# Patient Record
Sex: FEMALE | Race: WHITE | Employment: FULL TIME | URBAN - METROPOLITAN AREA
[De-identification: names, ages, dates, MRNs, and addresses within clinical notes are randomized per-mention and may not be internally consistent; named-entity substitution may affect disease eponyms.]

---

## 2018-02-16 PROBLEM — R22.32 FINGER MASS, LEFT: Status: ACTIVE | Noted: 2018-02-16

## 2021-02-17 PROBLEM — N20.1 RIGHT URETERAL STONE: Status: ACTIVE | Noted: 2021-02-17

## 2021-02-17 PROBLEM — N20.1 RIGHT URETERAL STONE: Status: RESOLVED | Noted: 2021-02-17 | Resolved: 2021-02-17

## 2021-02-17 PROBLEM — N39.0 ACUTE UTI: Status: ACTIVE | Noted: 2021-02-17

## 2022-07-10 ENCOUNTER — HOSPITAL ENCOUNTER (EMERGENCY)
Age: 62
Discharge: HOME | End: 2022-07-10
Attending: EMERGENCY MEDICINE
Payer: COMMERCIAL

## 2022-07-10 VITALS
HEIGHT: 64 IN | OXYGEN SATURATION: 100 % | WEIGHT: 118 LBS | TEMPERATURE: 98 F | SYSTOLIC BLOOD PRESSURE: 157 MMHG | DIASTOLIC BLOOD PRESSURE: 92 MMHG | BODY MASS INDEX: 20.14 KG/M2 | HEART RATE: 88 BPM | RESPIRATION RATE: 18 BRPM

## 2022-07-10 DIAGNOSIS — J45.909 UNCOMPLICATED ASTHMA, UNSPECIFIED ASTHMA SEVERITY, UNSPECIFIED WHETHER PERSISTENT: Primary | ICD-10-CM

## 2022-07-10 PROCEDURE — 99282 EMERGENCY DEPT VISIT SF MDM: CPT

## 2022-07-10 RX ORDER — FEXOFENADINE HYDROCHLORIDE 60 MG/1
60 TABLET, FILM COATED ORAL DAILY
COMMUNITY

## 2022-07-11 NOTE — ED INITIAL ASSESSMENT (HPI)
Pt states she has been without her inhaler for 3 weeks. States her asthma is getting worse. States she is leaving for vacation tomorrow and need meds before leaving.

## 2022-10-18 ENCOUNTER — TELEPHONE (OUTPATIENT)
Dept: FAMILY MEDICINE CLINIC | Facility: CLINIC | Age: 62
End: 2022-10-18

## 2022-10-18 NOTE — TELEPHONE ENCOUNTER
DOS 11/17/22 with Dr. Milka Arce for a foot surgery. Pt is having orders faxed over from surgeons office.  Checklist in folder at

## 2022-10-20 NOTE — TELEPHONE ENCOUNTER
Received fax from Dr Fabio Mora with pre op orders    Paper work placed in [employer] No assistance needed

## 2022-10-28 ENCOUNTER — TELEPHONE (OUTPATIENT)
Dept: FAMILY MEDICINE CLINIC | Facility: CLINIC | Age: 62
End: 2022-10-28

## 2022-10-28 ENCOUNTER — OFFICE VISIT (OUTPATIENT)
Dept: FAMILY MEDICINE CLINIC | Facility: CLINIC | Age: 62
End: 2022-10-28
Payer: COMMERCIAL

## 2022-10-28 VITALS
HEIGHT: 64 IN | BODY MASS INDEX: 19.12 KG/M2 | DIASTOLIC BLOOD PRESSURE: 72 MMHG | TEMPERATURE: 97 F | RESPIRATION RATE: 18 BRPM | HEART RATE: 78 BPM | WEIGHT: 112 LBS | SYSTOLIC BLOOD PRESSURE: 120 MMHG | OXYGEN SATURATION: 98 %

## 2022-10-28 DIAGNOSIS — Z23 NEED FOR INFLUENZA VACCINATION: ICD-10-CM

## 2022-10-28 DIAGNOSIS — Z01.818 PREOP EXAMINATION: Primary | ICD-10-CM

## 2022-10-28 DIAGNOSIS — R94.31 ABNORMAL EKG: Primary | ICD-10-CM

## 2022-10-28 DIAGNOSIS — R94.31 ABNORMAL EKG: ICD-10-CM

## 2022-10-28 DIAGNOSIS — Z01.818 PREOP EXAMINATION: ICD-10-CM

## 2022-10-28 PROBLEM — N39.0 ACUTE UTI: Status: RESOLVED | Noted: 2021-02-17 | Resolved: 2022-10-28

## 2022-10-28 PROBLEM — R22.32 FINGER MASS, LEFT: Status: RESOLVED | Noted: 2018-02-16 | Resolved: 2022-10-28

## 2022-10-28 LAB
ALBUMIN SERPL-MCNC: 3.8 G/DL (ref 3.4–5)
ALBUMIN/GLOB SERPL: 1.2 {RATIO} (ref 1–2)
ALP LIVER SERPL-CCNC: 47 U/L
ALT SERPL-CCNC: 32 U/L
ANION GAP SERPL CALC-SCNC: 5 MMOL/L (ref 0–18)
AST SERPL-CCNC: 26 U/L (ref 15–37)
BASOPHILS # BLD AUTO: 0.02 X10(3) UL (ref 0–0.2)
BASOPHILS NFR BLD AUTO: 0.4 %
BILIRUB SERPL-MCNC: 0.4 MG/DL (ref 0.1–2)
BUN BLD-MCNC: 21 MG/DL (ref 7–18)
CALCIUM BLD-MCNC: 9.1 MG/DL (ref 8.5–10.1)
CHLORIDE SERPL-SCNC: 102 MMOL/L (ref 98–112)
CHOLEST SERPL-MCNC: 244 MG/DL (ref ?–200)
CO2 SERPL-SCNC: 25 MMOL/L (ref 21–32)
CREAT BLD-MCNC: 0.79 MG/DL
EOSINOPHIL # BLD AUTO: 0.05 X10(3) UL (ref 0–0.7)
EOSINOPHIL NFR BLD AUTO: 1 %
ERYTHROCYTE [DISTWIDTH] IN BLOOD BY AUTOMATED COUNT: 13.1 %
FASTING PATIENT LIPID ANSWER: NO
FASTING STATUS PATIENT QL REPORTED: NO
GFR SERPLBLD BASED ON 1.73 SQ M-ARVRAT: 85 ML/MIN/1.73M2 (ref 60–?)
GLOBULIN PLAS-MCNC: 3.3 G/DL (ref 2.8–4.4)
GLUCOSE BLD-MCNC: 97 MG/DL (ref 70–99)
HCT VFR BLD AUTO: 37.9 %
HDLC SERPL-MCNC: 89 MG/DL (ref 40–59)
HGB BLD-MCNC: 12.4 G/DL
IMM GRANULOCYTES # BLD AUTO: 0.01 X10(3) UL (ref 0–1)
IMM GRANULOCYTES NFR BLD: 0.2 %
LDLC SERPL CALC-MCNC: 120 MG/DL (ref ?–100)
LYMPHOCYTES # BLD AUTO: 1.14 X10(3) UL (ref 1–4)
LYMPHOCYTES NFR BLD AUTO: 21.8 %
MCH RBC QN AUTO: 33.4 PG (ref 26–34)
MCHC RBC AUTO-ENTMCNC: 32.7 G/DL (ref 31–37)
MCV RBC AUTO: 102.2 FL
MONOCYTES # BLD AUTO: 0.5 X10(3) UL (ref 0.1–1)
MONOCYTES NFR BLD AUTO: 9.5 %
NEUTROPHILS # BLD AUTO: 3.52 X10 (3) UL (ref 1.5–7.7)
NEUTROPHILS # BLD AUTO: 3.52 X10(3) UL (ref 1.5–7.7)
NEUTROPHILS NFR BLD AUTO: 67.1 %
NONHDLC SERPL-MCNC: 155 MG/DL (ref ?–130)
OSMOLALITY SERPL CALC.SUM OF ELEC: 277 MOSM/KG (ref 275–295)
PLATELET # BLD AUTO: 333 10(3)UL (ref 150–450)
POTASSIUM SERPL-SCNC: 4.2 MMOL/L (ref 3.5–5.1)
PROT SERPL-MCNC: 7.1 G/DL (ref 6.4–8.2)
RBC # BLD AUTO: 3.71 X10(6)UL
SODIUM SERPL-SCNC: 132 MMOL/L (ref 136–145)
TRIGL SERPL-MCNC: 208 MG/DL (ref 30–149)
VLDLC SERPL CALC-MCNC: 37 MG/DL (ref 0–30)
WBC # BLD AUTO: 5.2 X10(3) UL (ref 4–11)

## 2022-10-28 PROCEDURE — 80061 LIPID PANEL: CPT | Performed by: FAMILY MEDICINE

## 2022-10-28 PROCEDURE — 99204 OFFICE O/P NEW MOD 45 MIN: CPT | Performed by: FAMILY MEDICINE

## 2022-10-28 PROCEDURE — 80053 COMPREHEN METABOLIC PANEL: CPT | Performed by: FAMILY MEDICINE

## 2022-10-28 PROCEDURE — 3078F DIAST BP <80 MM HG: CPT | Performed by: FAMILY MEDICINE

## 2022-10-28 PROCEDURE — 3074F SYST BP LT 130 MM HG: CPT | Performed by: FAMILY MEDICINE

## 2022-10-28 PROCEDURE — 85025 COMPLETE CBC W/AUTO DIFF WBC: CPT | Performed by: FAMILY MEDICINE

## 2022-10-28 PROCEDURE — 93000 ELECTROCARDIOGRAM COMPLETE: CPT | Performed by: FAMILY MEDICINE

## 2022-10-28 PROCEDURE — 3008F BODY MASS INDEX DOCD: CPT | Performed by: FAMILY MEDICINE

## 2022-10-28 RX ORDER — FLUTICASONE PROPIONATE 232 UG/1
1 POWDER, METERED RESPIRATORY (INHALATION) 2 TIMES DAILY
COMMUNITY
Start: 2022-09-06

## 2022-10-28 RX ORDER — FLUTICASONE PROPIONATE 232 UG/1
1 POWDER, METERED RESPIRATORY (INHALATION) DAILY
COMMUNITY
Start: 2022-09-06 | End: 2022-10-28 | Stop reason: ALTCHOICE

## 2022-10-28 NOTE — TELEPHONE ENCOUNTER
EDGAR phoned back. Patient has appointment Monday 10/31/22 with Dr. Douglas Guaman. EKG faxed to [facility].

## 2022-10-28 NOTE — PATIENT INSTRUCTIONS
You will be contacted by Michelle Verma, my nurse, once she is able to set up an appointment with the cardiologist for your surgical clearance. I will contact you with your test results once available. Once I have the clearance from the cardiologist, I will forward all the information to your surgeon. Avoid aspirin, Ibuprofen, Motrin, Advil, Aleve or Naproxen for 2 weeks prior to your surgery. Tylenol is safe to take if you have pain or fever. Contact me if you are having any problems with your asthma before your surgery. Otherwise, continue your medications as prescribed.

## 2022-10-28 NOTE — TELEPHONE ENCOUNTER
Patient needing cardiac clearance for upcoming surgery. Phoned [facility]. They will check schedule. Will reach out to patient and our office as well.

## 2022-10-31 ENCOUNTER — PATIENT MESSAGE (OUTPATIENT)
Dept: FAMILY MEDICINE CLINIC | Facility: CLINIC | Age: 62
End: 2022-10-31

## 2022-10-31 DIAGNOSIS — E87.1 HYPONATREMIA: ICD-10-CM

## 2022-10-31 DIAGNOSIS — R89.9 ABNORMAL LABORATORY TEST: Primary | ICD-10-CM

## 2022-11-03 ENCOUNTER — TELEPHONE (OUTPATIENT)
Dept: FAMILY MEDICINE CLINIC | Facility: CLINIC | Age: 62
End: 2022-11-03

## 2022-11-03 NOTE — TELEPHONE ENCOUNTER
Medical records received from Dr. Dewey Fulton, cardiologist, [address] [place] [place] [address] Drive 10/31/2022. Repeat EKG in his office was normal.    No further cardiac testing is required. Patient cleared for surgery.

## 2022-11-04 ENCOUNTER — LAB ENCOUNTER (OUTPATIENT)
Dept: LAB | Age: 62
End: 2022-11-04
Attending: FAMILY MEDICINE
Payer: COMMERCIAL

## 2022-11-04 DIAGNOSIS — R89.9 ABNORMAL LABORATORY TEST: ICD-10-CM

## 2022-11-04 DIAGNOSIS — E87.1 HYPONATREMIA: ICD-10-CM

## 2022-11-04 LAB
ANION GAP SERPL CALC-SCNC: 5 MMOL/L (ref 0–18)
BUN BLD-MCNC: 16 MG/DL (ref 7–18)
CALCIUM BLD-MCNC: 9.1 MG/DL (ref 8.5–10.1)
CHLORIDE SERPL-SCNC: 103 MMOL/L (ref 98–112)
CO2 SERPL-SCNC: 27 MMOL/L (ref 21–32)
CREAT BLD-MCNC: 0.64 MG/DL
FASTING STATUS PATIENT QL REPORTED: NO
FOLATE SERPL-MCNC: 7.7 NG/ML (ref 8.7–?)
GFR SERPLBLD BASED ON 1.73 SQ M-ARVRAT: 100 ML/MIN/1.73M2 (ref 60–?)
GLUCOSE BLD-MCNC: 79 MG/DL (ref 70–99)
OSMOLALITY SERPL CALC.SUM OF ELEC: 280 MOSM/KG (ref 275–295)
POTASSIUM SERPL-SCNC: 4.6 MMOL/L (ref 3.5–5.1)
SODIUM SERPL-SCNC: 135 MMOL/L (ref 136–145)
VIT B12 SERPL-MCNC: 848 PG/ML (ref 193–986)

## 2022-11-04 PROCEDURE — 82746 ASSAY OF FOLIC ACID SERUM: CPT

## 2022-11-04 PROCEDURE — 80048 BASIC METABOLIC PNL TOTAL CA: CPT

## 2022-11-04 PROCEDURE — 82607 VITAMIN B-12: CPT

## 2022-11-04 PROCEDURE — 36415 COLL VENOUS BLD VENIPUNCTURE: CPT

## 2022-11-07 ENCOUNTER — TELEPHONE (OUTPATIENT)
Dept: FAMILY MEDICINE CLINIC | Facility: CLINIC | Age: 62
End: 2022-11-07

## 2022-11-07 NOTE — TELEPHONE ENCOUNTER
Preop H&P, along with labs, EKG, cardiac clearance all faxed to [facility] and to [address] [place] [address] Specialist [phone]

## 2023-11-17 ENCOUNTER — TELEPHONE (OUTPATIENT)
Dept: ORTHOPEDICS CLINIC | Facility: CLINIC | Age: 63
End: 2023-11-17

## 2023-11-17 DIAGNOSIS — M25.521 RIGHT ELBOW PAIN: Primary | ICD-10-CM

## 2023-11-17 NOTE — TELEPHONE ENCOUNTER
Patient has an upcoming appt for RT Elbow . At this time patient has not had any imaging done, please place RX accordingly. I have notified the patient to come in 20-30 min prior to appointment time to have the imaging done . Please forward to the  pool to schedule imaging. Thank you.       Future Appointments   Date Time Provider Gabriella Coley   11/27/2023  3:00 PM DO JOHN Sanchez [id number]

## 2023-11-27 ENCOUNTER — OFFICE VISIT (OUTPATIENT)
Dept: ORTHOPEDICS CLINIC | Facility: CLINIC | Age: 63
End: 2023-11-27
Payer: COMMERCIAL

## 2023-11-27 ENCOUNTER — HOSPITAL ENCOUNTER (OUTPATIENT)
Dept: GENERAL RADIOLOGY | Age: 63
Discharge: HOME | End: 2023-11-27
Attending: FAMILY MEDICINE
Payer: COMMERCIAL

## 2023-11-27 VITALS — HEIGHT: 64 IN | BODY MASS INDEX: 19.12 KG/M2 | WEIGHT: 112 LBS

## 2023-11-27 DIAGNOSIS — M77.11 LATERAL EPICONDYLITIS OF RIGHT ELBOW: ICD-10-CM

## 2023-11-27 DIAGNOSIS — M25.521 RIGHT ELBOW PAIN: ICD-10-CM

## 2023-11-27 DIAGNOSIS — M77.01 GOLFER'S ELBOW, RIGHT: ICD-10-CM

## 2023-11-27 DIAGNOSIS — M24.129 INTERNAL DERANGEMENT OF ELBOW: Primary | ICD-10-CM

## 2023-11-27 PROCEDURE — 99204 OFFICE O/P NEW MOD 45 MIN: CPT | Performed by: FAMILY MEDICINE

## 2023-11-27 PROCEDURE — 3008F BODY MASS INDEX DOCD: CPT | Performed by: FAMILY MEDICINE

## 2023-11-27 PROCEDURE — 73080 X-RAY EXAM OF ELBOW: CPT | Performed by: FAMILY MEDICINE

## 2023-11-27 RX ORDER — PLECANATIDE 3 MG/1
TABLET ORAL
COMMUNITY
Start: 2023-07-27

## 2023-11-27 RX ORDER — FLUTICASONE PROPIONATE 110 UG/1
1 AEROSOL, METERED RESPIRATORY (INHALATION) 2 TIMES DAILY
COMMUNITY
Start: 2023-10-10

## 2023-12-12 ENCOUNTER — PATIENT MESSAGE (OUTPATIENT)
Dept: ORTHOPEDICS CLINIC | Facility: CLINIC | Age: 63
End: 2023-12-12

## 2023-12-22 ENCOUNTER — OFFICE VISIT (OUTPATIENT)
Dept: ORTHOPEDICS CLINIC | Facility: CLINIC | Age: 63
End: 2023-12-22
Payer: COMMERCIAL

## 2023-12-22 DIAGNOSIS — M77.01 GOLFER'S ELBOW, RIGHT: Primary | ICD-10-CM

## 2023-12-22 DIAGNOSIS — M79.18 BRACHIORADIALIS MUSCLE TENDERNESS: ICD-10-CM

## 2023-12-22 PROCEDURE — 99214 OFFICE O/P EST MOD 30 MIN: CPT | Performed by: FAMILY MEDICINE

## 2023-12-22 RX ORDER — MELOXICAM 15 MG/1
15 TABLET ORAL DAILY
Qty: 30 TABLET | Refills: 0 | Status: SHIPPED | OUTPATIENT
Start: 2023-12-22

## 2024-02-12 ENCOUNTER — APPOINTMENT (OUTPATIENT)
Dept: GENERAL RADIOLOGY | Age: 64
End: 2024-02-12
Attending: EMERGENCY MEDICINE
Payer: COMMERCIAL

## 2024-02-12 ENCOUNTER — HOSPITAL ENCOUNTER (EMERGENCY)
Age: 64
Discharge: HOME | End: 2024-02-12
Attending: EMERGENCY MEDICINE
Payer: COMMERCIAL

## 2024-02-12 ENCOUNTER — TELEPHONE (OUTPATIENT)
Dept: ORTHOPEDICS CLINIC | Facility: CLINIC | Age: 64
End: 2024-02-12

## 2024-02-12 VITALS
DIASTOLIC BLOOD PRESSURE: 69 MMHG | HEART RATE: 80 BPM | WEIGHT: 112 LBS | TEMPERATURE: 99 F | BODY MASS INDEX: 19.12 KG/M2 | RESPIRATION RATE: 16 BRPM | SYSTOLIC BLOOD PRESSURE: 129 MMHG | OXYGEN SATURATION: 99 % | HEIGHT: 64 IN

## 2024-02-12 DIAGNOSIS — S92.355A CLOSED NONDISPLACED FRACTURE OF FIFTH METATARSAL BONE OF LEFT FOOT, INITIAL ENCOUNTER: ICD-10-CM

## 2024-02-12 DIAGNOSIS — M25.571 ACUTE RIGHT ANKLE PAIN: ICD-10-CM

## 2024-02-12 DIAGNOSIS — S82.839A NONDISPLACED FRACTURE OF DISTAL END OF FIBULA: Primary | ICD-10-CM

## 2024-02-12 DIAGNOSIS — M25.572 ACUTE LEFT ANKLE PAIN: Primary | ICD-10-CM

## 2024-02-12 PROCEDURE — 99284 EMERGENCY DEPT VISIT MOD MDM: CPT

## 2024-02-12 PROCEDURE — 28470 CLTX METATARSAL FX WO MNP EA: CPT

## 2024-02-12 PROCEDURE — 73610 X-RAY EXAM OF ANKLE: CPT | Performed by: EMERGENCY MEDICINE

## 2024-02-12 PROCEDURE — 73630 X-RAY EXAM OF FOOT: CPT | Performed by: EMERGENCY MEDICINE

## 2024-02-12 NOTE — ED QUICK NOTES
Pt called the department asking for an explanation of her discharge teaching and follow up care. RN did review this with the patient. Pt verbalized understanding.

## 2024-02-12 NOTE — ED PROVIDER NOTES
Patient Seen in: [facility] [place]      History     Chief Complaint   Patient presents with    Leg or Foot Injury     Stated Complaint: milena ankle pain after tripping down stairs last night, pt arrives using crutches    Subjective:   HPI    Patient is a pleasant 63-year-old female here for evaluation of bilateral lower extremity injury.  Injury was sustained last evening while tripping/falling down stairs.  Patient cannot specifically recall exact mechanism of injury.  Denies history of trauma or fracture to lower extremities.  Does have a history of right bunionectomy.  Patient was able to bear weight after fall.  This morning, pain seems worsened with weightbearing activity.  Has been utilizing crutches for mobility assistance.  Right lateral ankle pain and swelling was noted this morning and left dorsal foot ecchymosis noted.  Denies numbness or tingling.      Objective:   Past Medical History:   Diagnosis Date    Asthma     Eczema     Infertility female     SEASONAL ALLERGIES               Past Surgical History:   Procedure Laterality Date    HAND/FINGER SURGERY UNLISTED Left 2018    excision of mass left ring finger                      Social History     Socioeconomic History    Marital status:    Tobacco Use    Smoking status: Former     Packs/day: 0.20     Years: 10.00     Additional pack years: 0.00     Total pack years: 2.00     Types: Cigarettes     Quit date: 1997     Years since quittin.2    Smokeless tobacco: Never    Tobacco comments:     quit 15 years ago   Vaping Use    Vaping Use: Never used   Substance and Sexual Activity    Alcohol use: Yes     Comment: socially    Drug use: No              Review of Systems    Positive for stated complaint: milena ankle pain after tripping down stairs last night, pt arrives using crutches  Other systems are as noted in HPI.  Constitutional and vital signs reviewed.      All other systems reviewed and negative except as  noted above.    Physical Exam     ED Triage Vitals [02/12/24 0840]   /69   Pulse 80   Resp 16   Temp 98.9 °F (37.2 °C)   Temp src Temporal   SpO2 99 %   O2 Device None (Room air)       Current:/69   Pulse 80   Temp 98.9 °F (37.2 °C) (Temporal)   Resp 16   Ht 162.6 cm (5' 4\")   Wt 50.8 kg   LMP 10/24/2011   SpO2 99%   BMI 19.22 kg/m²         Physical Exam  Vitals and nursing note reviewed.   Constitutional:       General: She is not in acute distress.     Appearance: Normal appearance. She is not ill-appearing, toxic-appearing or diaphoretic.   HENT:      Mouth/Throat:      Mouth: Mucous membranes are moist.   Eyes:      Pupils: Pupils are equal, round, and reactive to light.   Cardiovascular:      Rate and Rhythm: Normal rate.      Pulses: Normal pulses.   Pulmonary:      Effort: Pulmonary effort is normal.   Musculoskeletal:      Right ankle: Swelling present. Tenderness present over the posterior TF ligament. Anterior drawer test negative. Abnormal pulse.      Right Achilles Tendon: Normal.      Right foot: Normal.      Left foot: Normal capillary refill. Tenderness present. No swelling. Normal pulse.      Comments: Mild right lateral ankle edema.  There is no significant ecchymosis, warmth or erythema.  Pain reproducible with foot flexion, inversion and eversion.  There is no obvious deformity.    Moderate left dorsal tarsal and metatarsal ecchymosis.  Skin is intact.  There is no obvious deformity.       Neurological:      Mental Status: She is alert.             ED Course   Labs Reviewed - No data to display  XR ANKLE (MIN 3 VIEWS), RIGHT (CPT=73610)    Result Date: 2/12/2024  CONCLUSION:    Nondisplaced oblique fracture distal shaft of the fibula.  Adjacent tissue swelling.  No other fracture dislocation seen.  Ankle mortise shows no disruption.  LOCATION:  [facility]   Dictated by (CST): Fredy Del Real MD on 2/12/2024 at 9:20 AM     Finalized by (CST): Fredy Del Real MD on 2/12/2024 at 9:21  AM       XR FOOT, COMPLETE (MIN 3 VIEWS), LEFT (CPT=73630)    Result Date: 2/12/2024  CONCLUSION:    Nondisplaced oblique fracture mid and distal shaft of the 5th metatarsal bone.  Adjacent tissue swelling is seen in the lateral foot.  No other fracture, or dislocation is seen.  LOCATION:  [facility]   Dictated by (CST): Fredy Del Real MD on 2/12/2024 at 9:19 AM     Finalized by (CST): Fredy Del Real MD on 2/12/2024 at 9:20 AM                  [facility]                                  Medical Decision Making  Differentials include but are not limited to sprain, fracture and contusion.  X-rays reveal distal, nondisplaced fibula fracture of the right lower extremity and nondisplaced fifth metatarsal fracture of the left lower extremity.  Plan of care reviewed with Ortho, Dr. Sawyer, on-call.  Per Ortho advisement, patient was placed in a postop shoe for the left metatarsal fracture.  Walking boot obtained from  which was delivered to [facility].  Patient discharged with a walker for mobility assistance per Ortho advisement.  Patient insists on discharge prior to instructions. Patient then calls ER for clarification of instructions.  RN reviews discharge instructions over the phone. All questions answered per patient's satisfaction. Close outpatient follow up with Foot/ankle      Patient presentation and plan of care reviewed and formulated with Dr. Brooks, emergency department physician.    Amount and/or Complexity of Data Reviewed  Radiology: ordered and independent interpretation performed. Decision-making details documented in ED Course.        Disposition and Plan     Clinical Impression:  1. Nondisplaced fracture of distal end of fibula    2. Closed nondisplaced fracture of fifth metatarsal bone of left foot, initial encounter         Disposition:  Discharge  2/12/2024 11:48 am    Follow-up:  Laura Laboy, DPM  [address] [place] [id number]  [phone]    Schedule an appointment as soon as  possible for a visit            Medications Prescribed:  Discharge Medication List as of 2/12/2024 12:31 PM

## 2024-02-12 NOTE — ED INITIAL ASSESSMENT (HPI)
States she tripped down and fell on the garage steps last night.  C/o swelling and pain to milena foot/ankle.

## 2024-02-12 NOTE — TELEPHONE ENCOUNTER
Are you ok seeing this ankle Fx? If so how soon?    DOI: 2/11/24    Left foot: CONCLUSION:    Nondisplaced oblique fracture mid and distal shaft of the 5th metatarsal bone.  Adjacent tissue swelling is seen in the lateral foot.     Right ankle: CONCLUSION:    Nondisplaced oblique fracture distal shaft of the fibula.  Adjacent tissue swelling.  No other fracture dislocation seen.  Ankle mortise shows no disruption.

## 2024-02-12 NOTE — TELEPHONE ENCOUNTER
X-Ray ordered. Please schedule with the patient. Thank you    Please help patient get scheduled for wed/Thursday.      Called with no answer at this time.  Message left per release on identifying voicemail to call the office to get scheduled.

## (undated) NOTE — LETTER
ASTHMA ACTION PLAN for Mazin Almonte     : 3/17/1960     Date: 10/28/2022  Provider:  Perry Andrews DO  Phone for doctor or clinic: [facility], [address] [facility], [place]  [address] [facility] [address]  [address]  [phone]    ACT Score: 24      You can use the colors of a traffic light to help learn about your asthma medicines. 1. Green - Go! % of Personal Best Peak Flow Use controller medicine. Breathing is good  No cough or wheeze  Can work and play Medicine How much to take When to take it    Singulair (Montelukast) 10 mg by mouth daily  fexofenidine 180 mg daily         2. Yellow - Caution. 50-79% Personal Best Peak  Flow. Use reliever medicine to keep an asthma attack from getting bad. Cough  Wheezing  Tight Chest  Wake up at night Medicine How much to take When to take it    Albuterol inhaler,  2 puffs every four hours as needed. Additional instructions If your symptoms do not improve or require albuterol inhaler use every 4 to 6 hours, please contact our office at [phone] and ask to speak with the nurse. 3. Red - Stop! Danger!  <50% Personal Best Peak  Flow. Take these medications until  Get help from a doctor   Medicine not helping  Breathing is hard and fast  Nose opens wide  Can't walk  Ribs show  Can't talk well Medicine How much to take When to take it    Albuterol Inhaler - Take 2 puffs every 10 minutes. If no relief after 2 treatments or symptoms are worse, please go to nearest ER or call 911 immediately. Additional Instructions If your symptoms do not improve and you cannot contact your doctor, [facility] or call [facility] immediately! [x] Asthma Action Plan reviewed with patient (and caregiver if necessary) and a copy of the plan was given to the patient/caregiver. [] Asthma Action Plan reviewed with patient (and caregiver if necessary) on the phone and mailed copy to patient or submitted via [address] Signatures:  Provider  Perry Andrews, DO   Patient Caretaker